# Patient Record
Sex: MALE | NOT HISPANIC OR LATINO | URBAN - METROPOLITAN AREA
[De-identification: names, ages, dates, MRNs, and addresses within clinical notes are randomized per-mention and may not be internally consistent; named-entity substitution may affect disease eponyms.]

---

## 2017-08-07 ENCOUNTER — IMPORTED ENCOUNTER (OUTPATIENT)
Dept: URBAN - METROPOLITAN AREA CLINIC 1 | Facility: CLINIC | Age: 77
End: 2017-08-07

## 2017-08-07 PROBLEM — Z96.1: Noted: 2017-08-07

## 2017-08-07 PROBLEM — H01.004: Noted: 2017-08-07

## 2017-08-07 PROBLEM — H04.123: Noted: 2017-08-07

## 2017-08-07 PROBLEM — H02.834: Noted: 2017-08-07

## 2017-08-07 PROBLEM — H02.831: Noted: 2017-08-07

## 2017-08-07 PROBLEM — H26.493: Noted: 2017-08-07

## 2017-08-07 PROBLEM — H16.143: Noted: 2017-08-07

## 2017-08-07 PROBLEM — H01.001: Noted: 2017-08-07

## 2017-08-07 PROCEDURE — 92014 COMPRE OPH EXAM EST PT 1/>: CPT

## 2017-08-07 NOTE — PATIENT DISCUSSION
1.  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 Pros cons risks and benefits. 2.  Pseudophakia OU - (Standard OD/ Toric OS) 3. WASHINGTON w/ PEK OU- Cont ATs BID OU routinely. 4.  Blepharitis anterior type OU - Cont Daily warm compresses and lid scrubs were recommended. 5. Dermatochalasis OU UL's  - Follow with no intervention at this time. Letter to PCP Schedule YAG Cap OS then OD.

## 2017-08-18 ENCOUNTER — IMPORTED ENCOUNTER (OUTPATIENT)
Dept: URBAN - METROPOLITAN AREA CLINIC 1 | Facility: CLINIC | Age: 77
End: 2017-08-18

## 2017-08-18 PROBLEM — H26.492: Noted: 2017-08-18

## 2017-08-18 PROCEDURE — 66821 AFTER CATARACT LASER SURGERY: CPT

## 2017-08-18 NOTE — PATIENT DISCUSSION
YAG CAP OS: (Consent signed and scanned into attachments) 1 gtt Prolensa applied. The purpose and nature of the procedure possible alternative methods of treatment the risks involved and the possibility of complications were discussed with patient. The Patient wishes to proceed and the consent was signed. The laser was then performed under topical anesthesia with no complications. Post op instructions were given to patient as well as a follow-up appointment. Patient was advised to call our office if any questions or concerns. Return for an appointment for 2nd eye Yag as scheduled with Dr. Bruce Kay.

## 2017-09-08 ENCOUNTER — IMPORTED ENCOUNTER (OUTPATIENT)
Dept: URBAN - METROPOLITAN AREA CLINIC 1 | Facility: CLINIC | Age: 77
End: 2017-09-08

## 2017-09-08 PROBLEM — H26.491: Noted: 2017-09-08

## 2017-09-08 PROCEDURE — 66821 AFTER CATARACT LASER SURGERY: CPT

## 2017-09-08 NOTE — PATIENT DISCUSSION
YAG CAP OD: (Consent signed and scanned into attachments) 1 gtt Prolensa applied. The purpose and nature of the procedure possible alternative methods of treatment the risks involved and the possibility of complications were discussed with patient. The Patient wishes to proceed and the consent was signed. The laser was then performed under topical anesthesia with no complications. Post op instructions were given to patient as well as a follow-up appointment. Patient was advised to call our office if any questions or concerns.  RTC 1-4 weeks YAG PO.

## 2017-09-25 ENCOUNTER — IMPORTED ENCOUNTER (OUTPATIENT)
Dept: URBAN - METROPOLITAN AREA CLINIC 1 | Facility: CLINIC | Age: 77
End: 2017-09-25

## 2017-09-25 PROCEDURE — 99024 POSTOP FOLLOW-UP VISIT: CPT

## 2017-09-25 NOTE — PATIENT DISCUSSION
PO YAG Cap OU: good result. MRX given. Return for an appointment in July/ August 30 with Dr. Mckenzie Orellana.

## 2018-07-30 ENCOUNTER — IMPORTED ENCOUNTER (OUTPATIENT)
Dept: URBAN - METROPOLITAN AREA CLINIC 1 | Facility: CLINIC | Age: 78
End: 2018-07-30

## 2018-07-30 PROBLEM — H01.001: Noted: 2018-07-30

## 2018-07-30 PROBLEM — H16.143: Noted: 2018-07-30

## 2018-07-30 PROBLEM — H04.123: Noted: 2018-07-30

## 2018-07-30 PROBLEM — H01.004: Noted: 2018-07-30

## 2018-07-30 PROBLEM — H02.831: Noted: 2018-07-30

## 2018-07-30 PROBLEM — H02.834: Noted: 2018-07-30

## 2018-07-30 PROBLEM — Z96.1: Noted: 2018-07-30

## 2018-07-30 PROCEDURE — 92014 COMPRE OPH EXAM EST PT 1/>: CPT

## 2018-07-30 NOTE — PATIENT DISCUSSION
1.  W[place] w/ PEK OU- Increase ATs to BID OU routinely (Sample of Blink given) 2. Anterior Blepharitis OU - Cont Daily warm compresses and lid scrubs were recommended. 3. 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  Dermatochalasis OU UL's  - Follow with no intervention at this time. Letter to PCP Return for an appointment in 1 yr 27 with Dr. Mckenzie Orellana.

## 2019-08-26 ENCOUNTER — IMPORTED ENCOUNTER (OUTPATIENT)
Dept: URBAN - METROPOLITAN AREA CLINIC 1 | Facility: CLINIC | Age: 79
End: 2019-08-26

## 2019-08-26 PROBLEM — H16.143: Noted: 2019-08-26

## 2019-08-26 PROBLEM — H01.001: Noted: 2019-08-26

## 2019-08-26 PROBLEM — H01.004: Noted: 2019-08-26

## 2019-08-26 PROBLEM — H04.123: Noted: 2019-08-26

## 2019-08-26 PROCEDURE — 92014 COMPRE OPH EXAM EST PT 1/>: CPT

## 2019-08-26 NOTE — PATIENT DISCUSSION
1.  WASHINGTON w/ PEK OU- Use ATs TID OU Routinely. 2.  Posterior Blepharitis OU - Cont Daily hot compresses and lid scrubs were recommended. 3. 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  Dermatochalasis OU UL's  - observe. Letter to PCP MRx deferred Return for an appointment in 1 yr 27 with Dr. Fernanda Greene.

## 2022-04-02 ASSESSMENT — TONOMETRY
OD_IOP_MMHG: 16
OD_IOP_MMHG: 16
OS_IOP_MMHG: 16
OD_IOP_MMHG: 17
OS_IOP_MMHG: 15
OS_IOP_MMHG: 14
OS_IOP_MMHG: 16
OD_IOP_MMHG: 16

## 2022-04-02 ASSESSMENT — VISUAL ACUITY
OS_CC: 20/30
OD_SC: 20/25
OS_CC: 20/30-1
OD_CC: 20/25
OS_CC: 20/40
OD_CC: 20/30-2
OS_SC: 20/20-1
OD_GLARE: 20/40
OS_GLARE: 20/70
OD_CC: 20/30